# Patient Record
Sex: FEMALE | Race: WHITE | Employment: STUDENT | URBAN - METROPOLITAN AREA
[De-identification: names, ages, dates, MRNs, and addresses within clinical notes are randomized per-mention and may not be internally consistent; named-entity substitution may affect disease eponyms.]

---
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
## 2026-08-04 ENCOUNTER — APPOINTMENT (OUTPATIENT)
Dept: FAMILY MEDICINE | Age: 21
End: 2026-08-04

## (undated) NOTE — IP AVS SNAPSHOT
[facility] [place] [address] [facility], [address] ~ [phone]                Discharge Summary   1/16/2017    Larisa Morrison           Admission Information        Provider Department    1/16/2017 Latrell Quiroga MD  1se-B         Thank Contact information:    [address] [facility] [id number] [phone]          Schedule an appointment as soon as possible for a visit with Sherre Denver, MD.    Specialty:  Pediatric Neurology/Neurosurgery    Contact information:    [address]